# Patient Record
Sex: MALE | Race: WHITE | NOT HISPANIC OR LATINO | URBAN - METROPOLITAN AREA
[De-identification: names, ages, dates, MRNs, and addresses within clinical notes are randomized per-mention and may not be internally consistent; named-entity substitution may affect disease eponyms.]

---

## 2024-11-18 ENCOUNTER — APPOINTMENT (RX ONLY)
Dept: URBAN - METROPOLITAN AREA CLINIC 167 | Facility: CLINIC | Age: 19
Setting detail: DERMATOLOGY
End: 2024-11-18

## 2024-11-18 DIAGNOSIS — Z41.9 ENCOUNTER FOR PROCEDURE FOR PURPOSES OTHER THAN REMEDYING HEALTH STATE, UNSPECIFIED: ICD-10-CM

## 2024-11-18 PROCEDURE — ? COSMETIC CONSULTATION: FACIAL

## 2024-11-18 PROCEDURE — ? COSMETIC QUOTE

## 2024-11-18 PROCEDURE — ? ADDITIONAL NOTES

## 2024-11-18 PROCEDURE — ? COSMETIC CONSULTATION - MICRO-NEEDLING

## 2024-11-18 NOTE — PROCEDURE: COSMETIC QUOTE
Misc Procedure 1 Price/Unit (In Dollars- Use Only Numbers And Decimals): 0.00
Injectable 11 Percentage Discount: 0
Face Procedure 2 Units: 3
Apply Facility Fee: No
Face Procedure 2 Percentage Discount: 10
Facility Fee Units (Optional): 1
Face Procedure 1: facial
Face Procedure 1 Price/Unit (In Dollars- Use Only Numbers And Decimals): 150
Body Procedure 1 Price/Unit (In Dollars- Use Only Numbers And Decimals): 515
Face Procedure 2: skin pen
Detail Level: Zone
Include Sales Tax On Surgeon's Fees: Yes
Body Procedure 1: PRP microneedling scalp.
Face Procedure 2 Price/Unit (In Dollars- Use Only Numbers And Decimals): 379

## 2024-11-18 NOTE — PROCEDURE: ADDITIONAL NOTES
Render Risk Assessment In Note?: no
Detail Level: Simple
Additional Notes: Pt using cl. And Tx pads.

## 2024-11-25 ENCOUNTER — APPOINTMENT (RX ONLY)
Dept: URBAN - METROPOLITAN AREA CLINIC 167 | Facility: CLINIC | Age: 19
Setting detail: DERMATOLOGY
End: 2024-11-25

## 2024-11-25 DIAGNOSIS — Z41.9 ENCOUNTER FOR PROCEDURE FOR PURPOSES OTHER THAN REMEDYING HEALTH STATE, UNSPECIFIED: ICD-10-CM

## 2024-11-25 PROCEDURE — ? FACIAL

## 2024-11-25 ASSESSMENT — LOCATION DETAILED DESCRIPTION DERM
LOCATION DETAILED: RIGHT LATERAL MALAR CHEEK
LOCATION DETAILED: LEFT SUPERIOR FOREHEAD
LOCATION DETAILED: NASAL DORSUM
LOCATION DETAILED: GLABELLA
LOCATION DETAILED: RIGHT LOWER CUTANEOUS LIP
LOCATION DETAILED: LEFT CENTRAL MALAR CHEEK
LOCATION DETAILED: RIGHT LATERAL FOREHEAD
LOCATION DETAILED: PHILTRUM

## 2024-11-25 ASSESSMENT — LOCATION SIMPLE DESCRIPTION DERM
LOCATION SIMPLE: RIGHT CHEEK
LOCATION SIMPLE: LEFT FOREHEAD
LOCATION SIMPLE: RIGHT FOREHEAD
LOCATION SIMPLE: NOSE
LOCATION SIMPLE: UPPER LIP
LOCATION SIMPLE: LEFT CHEEK
LOCATION SIMPLE: GLABELLA
LOCATION SIMPLE: RIGHT LIP

## 2024-11-25 ASSESSMENT — LOCATION ZONE DERM
LOCATION ZONE: FACE
LOCATION ZONE: NOSE
LOCATION ZONE: LIP

## 2024-11-25 NOTE — PROCEDURE: FACIAL
Extraction Method: sterile needle
Treatment Serum Override: calm and correct, antiox. serum and spf
Treatment Serum (Optional): PhytoCorrect
Mask Type (Optional): calming
Detail Level: Zone
Price (Use Numbers Only, No Special Characters Or $): 150
Treatment Type Override: 2/2 cleanser x2
Exfoliation Type: enzyme
Treatment Type (Optional): Deep Cleansing Facial